# Patient Record
Sex: MALE | Race: WHITE | NOT HISPANIC OR LATINO | Employment: FULL TIME | URBAN - NONMETROPOLITAN AREA
[De-identification: names, ages, dates, MRNs, and addresses within clinical notes are randomized per-mention and may not be internally consistent; named-entity substitution may affect disease eponyms.]

---

## 2024-01-22 ENCOUNTER — TELEMEDICINE (OUTPATIENT)
Dept: PRIMARY CARE | Facility: CLINIC | Age: 49
End: 2024-01-22
Payer: COMMERCIAL

## 2024-01-22 DIAGNOSIS — J01.00 ACUTE NON-RECURRENT MAXILLARY SINUSITIS: Primary | ICD-10-CM

## 2024-01-22 PROCEDURE — 99213 OFFICE O/P EST LOW 20 MIN: CPT | Performed by: FAMILY MEDICINE

## 2024-01-22 RX ORDER — AMOXICILLIN AND CLAVULANATE POTASSIUM 875; 125 MG/1; MG/1
875 TABLET, FILM COATED ORAL 2 TIMES DAILY
Qty: 14 TABLET | Refills: 0 | Status: SHIPPED | OUTPATIENT
Start: 2024-01-22 | End: 2024-01-29

## 2024-01-22 ASSESSMENT — ENCOUNTER SYMPTOMS
RHINORRHEA: 1
CHILLS: 0
FEVER: 0
HEMOPTYSIS: 0
HEARTBURN: 0
COUGH: 1
HEADACHES: 1

## 2024-01-22 NOTE — PROGRESS NOTES
Subjective   Patient ID: Sherman Vargas is a 48 y.o. male who presents for No chief complaint on file..    Cough  This is a new problem. The current episode started 1 to 4 weeks ago. The problem has been waxing and waning. The problem occurs hourly. The cough is Non-productive and productive of brown sputum. Associated symptoms include headaches, nasal congestion and rhinorrhea. Pertinent negatives include no chest pain, chills, ear congestion, ear pain, fever, heartburn or hemoptysis. Nothing aggravates the symptoms.      He presents with 3 weeks of not feeling well.  Has had nasal congestion.  Some cough.  Not sure if he has a fever anymore but did.  There is some congestion in his face.  Feels like there is phlegm in his throat.    Review of Systems   Constitutional:  Negative for chills and fever.   HENT:  Positive for rhinorrhea. Negative for ear pain.    Respiratory:  Positive for cough. Negative for hemoptysis.    Cardiovascular:  Negative for chest pain.   Gastrointestinal:  Negative for heartburn.   Neurological:  Positive for headaches.       Objective   There were no vitals taken for this visit.    Physical Exam  Constitutional:       Appearance: Normal appearance.   Eyes:      Conjunctiva/sclera: Conjunctivae normal.   Pulmonary:      Effort: Pulmonary effort is normal.   Skin:     Coloration: Skin is not jaundiced.   Neurological:      Mental Status: He is alert.   Psychiatric:         Mood and Affect: Mood normal.         Assessment/Plan   Diagnoses and all orders for this visit:  Acute non-recurrent maxillary sinusitis  -     amoxicillin-pot clavulanate (Augmentin) 875-125 mg tablet; Take 1 tablet (875 mg) by mouth 2 times a day for 7 days.  Will treat with the Augmentin for a week.  I told him to take this after breakfast and after dinner.  If he is not improving over the next 2 to 3 days, he will call and we will add a steroid.    This visit was done using the epic software so I could see Sherman and  talk to him.

## 2024-01-24 ENCOUNTER — OFFICE VISIT (OUTPATIENT)
Dept: SLEEP MEDICINE | Facility: CLINIC | Age: 49
End: 2024-01-24
Payer: COMMERCIAL

## 2024-01-24 VITALS
HEART RATE: 75 BPM | OXYGEN SATURATION: 94 % | HEIGHT: 73 IN | BODY MASS INDEX: 28.49 KG/M2 | DIASTOLIC BLOOD PRESSURE: 68 MMHG | WEIGHT: 215 LBS | SYSTOLIC BLOOD PRESSURE: 124 MMHG

## 2024-01-24 DIAGNOSIS — G47.33 OBSTRUCTIVE SLEEP APNEA (ADULT) (PEDIATRIC): Primary | ICD-10-CM

## 2024-01-24 PROCEDURE — 1036F TOBACCO NON-USER: CPT | Performed by: INTERNAL MEDICINE

## 2024-01-24 PROCEDURE — 99213 OFFICE O/P EST LOW 20 MIN: CPT | Performed by: INTERNAL MEDICINE

## 2024-01-24 ASSESSMENT — SLEEP AND FATIGUE QUESTIONNAIRES
HOW LIKELY ARE YOU TO NOD OFF OR FALL ASLEEP WHILE LYING DOWN TO REST IN THE AFTERNOON WHEN CIRCUMSTANCES PERMIT: HIGH CHANCE OF DOZING
HOW LIKELY ARE YOU TO NOD OFF OR FALL ASLEEP WHILE SITTING AND TALKING TO SOMEONE: WOULD NEVER DOZE
HOW LIKELY ARE YOU TO NOD OFF OR FALL ASLEEP IN A CAR, WHILE STOPPED FOR A FEW MINUTES IN TRAFFIC: WOULD NEVER DOZE
HOW LIKELY ARE YOU TO NOD OFF OR FALL ASLEEP WHILE WATCHING TV: MODERATE CHANCE OF DOZING
HOW LIKELY ARE YOU TO NOD OFF OR FALL ASLEEP WHILE SITTING AND READING: HIGH CHANCE OF DOZING
HOW LIKELY ARE YOU TO NOD OFF OR FALL ASLEEP WHEN YOU ARE A PASSENGER IN A CAR FOR AN HOUR WITHOUT A BREAK: MODERATE CHANCE OF DOZING
SITING INACTIVE IN A PUBLIC PLACE LIKE A CLASS ROOM OR A MOVIE THEATER: MODERATE CHANCE OF DOZING
ESS-CHAD TOTAL SCORE: 13
HOW LIKELY ARE YOU TO NOD OFF OR FALL ASLEEP WHILE SITTING QUIETLY AFTER LUNCH WITHOUT ALCOHOL: SLIGHT CHANCE OF DOZING

## 2024-01-24 ASSESSMENT — PAIN SCALES - GENERAL: PAINLEVEL: 0-NO PAIN

## 2024-01-24 NOTE — ASSESSMENT & PLAN NOTE
- Sherman Vargas  has sleep apnea and requires treatment.  - Sherman Vargas demonstrates good compliance and benefit from PAP therapy  - Continue Bilevel  PAP 14/10 cmH20 through [facility]  - Order for renewal of PAP supplies placed  -Ordered a ResMed F30 I fit pack to be supplied through [facility] to try instead of the F30

## 2024-01-24 NOTE — PROGRESS NOTES
"  Subjective   Patient ID: Sherman Vargas is a 48 y.o. male who presents for Sleep Apnea, Pap Adherence Followup, and Needs Pap Supplies/new Pap Machine.  HPI    Prior Sleep History:  Recommended he try a nasal mask due to chronic rhinitis being    Current Sleep History:  Here for yearly adherence of bilevel PAP    A downloaded compliance report was reviewed and was interpreted by myself as follows:  > 4 hour compliance was 100%, with an average use of 6 hours and 25 minutes, with a residual AHI 1.9 on bilevel PAP 14/10 cm H2O.     Sherman Vargas reports good benefit from his device.  Reports that he is sleepy when he is inactive or when he has not gotten enough sleep.  Otherwise he is doing well.  He did not like nasal mask and prefers fullface.  Has a ResMed F30 mask which occasionally will leak tries to manage facial hair to minimize leak  ESS: 13     Review of Systems  Review of systems negative except as per HPI  Objective   /68   Pulse 75   Ht 1.854 m (6' 1\")   Wt 97.5 kg (215 lb)   SpO2 94%   BMI 28.37 kg/m²    Physical Exam  PHYSICAL EXAM: GENERAL: alert pleasant and cooperative no acute distress  PSYCH EXAM: alert,oriented, in NAD with a full range of affect, normal behavior and no psychotic features    No results found for: \"IRON\", \"TIBC\", \"FERRITIN\"     Assessment/Plan   Problem List Items Addressed This Visit             ICD-10-CM    Obstructive sleep apnea (adult) (pediatric) - Primary G47.33     - Sherman Vargas  has sleep apnea and requires treatment.  - Sherman Vargas demonstrates good compliance and benefit from PAP therapy  - Continue Bilevel  PAP 14/10 cmH20 through [facility]  - Order for renewal of PAP supplies placed  -Ordered a ResMed F30 I fit pack to be supplied through [facility] to try instead of the F30         Relevant Orders    Positive Airway Pressure (PAP) Therapy            "

## 2024-08-19 ENCOUNTER — OFFICE VISIT (OUTPATIENT)
Dept: ORTHOPEDIC SURGERY | Facility: HOSPITAL | Age: 49
End: 2024-08-19
Payer: COMMERCIAL

## 2024-08-19 ENCOUNTER — HOSPITAL ENCOUNTER (OUTPATIENT)
Dept: RADIOLOGY | Facility: HOSPITAL | Age: 49
Discharge: HOME | End: 2024-08-19
Payer: COMMERCIAL

## 2024-08-19 ENCOUNTER — OFFICE VISIT (OUTPATIENT)
Dept: PRIMARY CARE | Facility: CLINIC | Age: 49
End: 2024-08-19
Payer: COMMERCIAL

## 2024-08-19 VITALS
BODY MASS INDEX: 29.13 KG/M2 | SYSTOLIC BLOOD PRESSURE: 134 MMHG | DIASTOLIC BLOOD PRESSURE: 86 MMHG | WEIGHT: 220.8 LBS | HEART RATE: 78 BPM

## 2024-08-19 VITALS — HEIGHT: 73 IN | WEIGHT: 220 LBS | BODY MASS INDEX: 29.16 KG/M2

## 2024-08-19 DIAGNOSIS — M77.01 MEDIAL EPICONDYLITIS OF RIGHT ELBOW: Primary | ICD-10-CM

## 2024-08-19 DIAGNOSIS — M25.521 ELBOW PAIN, RIGHT: ICD-10-CM

## 2024-08-19 DIAGNOSIS — M62.838 MUSCLE SPASM: Primary | ICD-10-CM

## 2024-08-19 PROBLEM — J31.0 CHRONIC RHINITIS: Status: ACTIVE | Noted: 2024-08-19

## 2024-08-19 PROBLEM — K21.9 GASTROESOPHAGEAL REFLUX DISEASE: Status: ACTIVE | Noted: 2024-08-19

## 2024-08-19 PROBLEM — E78.00 HYPERCHOLESTEROLEMIA: Status: ACTIVE | Noted: 2024-08-19

## 2024-08-19 PROBLEM — J01.00 ACUTE MAXILLARY SINUSITIS: Status: ACTIVE | Noted: 2024-08-19

## 2024-08-19 PROBLEM — J30.9 ALLERGIC RHINITIS: Status: ACTIVE | Noted: 2024-08-19

## 2024-08-19 PROCEDURE — 1036F TOBACCO NON-USER: CPT | Performed by: FAMILY MEDICINE

## 2024-08-19 PROCEDURE — 99204 OFFICE O/P NEW MOD 45 MIN: CPT | Performed by: FAMILY MEDICINE

## 2024-08-19 PROCEDURE — 3008F BODY MASS INDEX DOCD: CPT | Performed by: FAMILY MEDICINE

## 2024-08-19 PROCEDURE — 73080 X-RAY EXAM OF ELBOW: CPT | Mod: RT

## 2024-08-19 PROCEDURE — 99213 OFFICE O/P EST LOW 20 MIN: CPT | Performed by: FAMILY MEDICINE

## 2024-08-19 PROCEDURE — 73080 X-RAY EXAM OF ELBOW: CPT | Mod: RIGHT SIDE | Performed by: RADIOLOGY

## 2024-08-19 PROCEDURE — 99214 OFFICE O/P EST MOD 30 MIN: CPT | Performed by: FAMILY MEDICINE

## 2024-08-19 PROCEDURE — L3908 WHO COCK-UP NONMOLDE PRE OTS: HCPCS | Performed by: FAMILY MEDICINE

## 2024-08-19 RX ORDER — PREDNISONE 20 MG/1
20 TABLET ORAL DAILY
COMMUNITY
Start: 2024-08-11

## 2024-08-19 RX ORDER — CYCLOBENZAPRINE HCL 10 MG
10 TABLET ORAL
COMMUNITY
Start: 2024-08-11

## 2024-08-19 RX ORDER — MONTELUKAST SODIUM 10 MG/1
10 TABLET ORAL DAILY
COMMUNITY

## 2024-08-19 RX ORDER — OMEPRAZOLE 40 MG/1
40 CAPSULE, DELAYED RELEASE ORAL
COMMUNITY

## 2024-08-19 ASSESSMENT — PATIENT HEALTH QUESTIONNAIRE - PHQ9
2. FEELING DOWN, DEPRESSED OR HOPELESS: NOT AT ALL
1. LITTLE INTEREST OR PLEASURE IN DOING THINGS: NOT AT ALL
SUM OF ALL RESPONSES TO PHQ9 QUESTIONS 1 AND 2: 0

## 2024-08-19 ASSESSMENT — PAIN SCALES - GENERAL
PAINLEVEL: 0-NO PAIN
PAINLEVEL_OUTOF10: 1

## 2024-08-19 ASSESSMENT — PAIN - FUNCTIONAL ASSESSMENT: PAIN_FUNCTIONAL_ASSESSMENT: 0-10

## 2024-08-19 NOTE — PROGRESS NOTES
Subjective   Patient ID: Sherman Vargas is a 49 y.o. male who presents for  follow up   Tendinitus.    HPI   He presents with pain in the right bicep which began last week.  It was so painful that he went over to the urgent care.  They referred him over to orthopedics and then sent him back here.  He would get pain so it is difficult to extend the arm completely.  Pain was on either side of the biceps tendon generally.  He has had some pain in the lateral epicondyle in the past, he states.    Review of Systems    Objective   /86   Pulse 78   Wt 100 kg (220 lb 12.8 oz)   BMI 29.13 kg/m²     Physical Exam  Constitutional:       Appearance: Normal appearance. He is not ill-appearing.   Pulmonary:      Effort: Pulmonary effort is normal.   Musculoskeletal:      Comments: Does not have pain with palpation of the biceps tendon but does have some spasm and tenderness just distal to it and just a little bit more on the proximal side.  Mild tenderness over the lateral epicondyle as well on the right side.   Neurological:      Mental Status: He is alert.         Assessment/Plan   Diagnoses and all orders for this visit:  Muscle spasm  I think this is mostly related to muscle spasm.  I recommend that he massage this area firmly with his thumb 2 or 3 times a day and see if he can release that.  May use heat on this.  Let me know if that is not resolving the symptoms.  He can also keep the appointment with his orthopedist.  Could add laith to the diet as well to help with the muscles.

## 2024-08-19 NOTE — PROGRESS NOTES
** Please excuse any errors in grammar or translation related to this dictation. Voice recognition software was utilized to prepare this document. **    Assessment & Plan:    Dx: Right medial epicondylopathy    Clinical assessment most consistent with right medial epicondylopathy.  Discussed with patient this is related to repetitive wrist flexion causing strain of the common flexor tendon at its origin site.  Discussed treatment options to include counterforce brace, topical or oral NSAID, rehab conditioning program, steroid injection.  Referral to occupational therapy placed.  Recommend he continue use counterforce brace during working hours.  Also recommending use of a wrist brace to provide stability particularly while performing his job as a .  Return precautions given.  Follow-up in clinic for reassessment if condition not improving over the next 5 to 6 weeks or acutely worsens.    Patient was prescribed a neutral wrist brace for medial epicondylopathy.  The patient has weakness, pain, instability and/or deformity of their right wrist which requires stabilization from this orthosis to improve their function.    Verbal instructions for the use, wear schedule, cleaning and application of this item were given.  Patient was instructed that should the brace result in increased pain, decreased sensation, increased swelling, or an overall worsening of their medical condition, to please contact our office immediately. Orthotic management and training was provided for skin care, modifications due to healing tissues, edema changes, interruption in skin integrity, and safety precautions with the orthosis.      Chief complaint:  Right elbow pain    HPI:  49-year-old right dominant male presents to Ortho injury clinic with right elbow pain.  Symptoms has been present for several months however acutely worsened 1 week ago.  For this reason he went to an urgent care and received steroid shot in his buttock and  prescribed a short course of oral steroids to follow.  He reports this resolved his acuity but continues to experience his baseline level of pain.  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  Prior to his urgent care visit he had been using a counterforce brace as well as an elbow sleeve and taking oral NSAIDs.  No trauma to the elbow reported.  Pain is localized to the medial aspect of the forearm.    Patient Active Problem List   Diagnosis    Obstructive sleep apnea (adult) (pediatric)    Acute maxillary sinusitis    Allergic rhinitis    Chronic rhinitis    Gastroesophageal reflux disease    Hypercholesterolemia     History reviewed. No pertinent surgical history.  Current Outpatient Medications on File Prior to Visit   Medication Sig Dispense Refill    cyclobenzaprine (Flexeril) 10 mg tablet Take 1 tablet (10 mg) by mouth.      montelukast (Singulair) 10 mg tablet Take 1 tablet (10 mg) by mouth once daily.      omeprazole (PriLOSEC) 40 mg DR capsule Take 1 capsule (40 mg) by mouth once daily in the morning. Take before meals.      predniSONE (Deltasone) 20 mg tablet Take 1 tablet (20 mg) by mouth once daily.       No current facility-administered medications on file prior to visit.       Exam:  RIGHT Elbow Exam:  No swelling, erythema or ecchymosis present.  Non-TTP over radial head, olecranon, medial epicondyle, lateral epicondyle  Flexion to [150] of 150deg, Extension to [0] of 0deg, Pronate/Supinate [90]/[90] of 90/90deg  5/5 strength elbow flexion and extension, wrist extension and flexion, finger spread  SILT  [Negative] Tinel´s at Cubital tunnel  Increased pain with resisted wrist flexion  [No] pain with resisted wrist extension  [Negative] Modified milking maneuver  [Negative] Hook test    General Exam:  Constitutional - NAD, AAO x 3, conversing appropriately.  HEENT- Normocephalic and atraumatic. EOMI, PERRLA, No scleral icterus. No facial deformities. Hearing grossly  normal.  Lungs - Breathing non-labored with normal rate. No accessory muscle use.  CV - Extremities warm and well-perfused, brisk capillary refill present.   Neuro - CN II-XII grossly intact.    Results:  X-rays of right elbow obtained 8/19/2024 personally interpreted as no acute fracture with normal alignment.  Degenerative changes with joint space narrowing and osteophyte formation are present.  Lab Results   Component Value Date    CREATININE 1.1 06/17/2023    EGFR 84 06/17/2023

## 2024-08-19 NOTE — PATIENT INSTRUCTIONS
Heat this area and massage it firmly.   Let me know if that's not doing it.  May add laith to the diet and any root vegetable or peanuts.

## 2024-08-27 ENCOUNTER — EVALUATION (OUTPATIENT)
Dept: OCCUPATIONAL THERAPY | Facility: CLINIC | Age: 49
End: 2024-08-27
Payer: COMMERCIAL

## 2024-08-27 DIAGNOSIS — M25.521 RIGHT ELBOW PAIN: Primary | ICD-10-CM

## 2024-08-27 DIAGNOSIS — M77.01 MEDIAL EPICONDYLITIS OF RIGHT ELBOW: ICD-10-CM

## 2024-08-27 PROCEDURE — 97165 OT EVAL LOW COMPLEX 30 MIN: CPT | Mod: GO

## 2024-08-27 PROCEDURE — 97140 MANUAL THERAPY 1/> REGIONS: CPT | Mod: GO

## 2024-08-27 NOTE — PROGRESS NOTES
Occupational Therapy  Occupational Therapy Orthopedic Evaluation    Patient Name: Sherman Vargas  MRN: [id number]  Today's Date: 8/27/2024  Time Calculation  Start Time: 1535  Stop Time: 1615  Time Calculation (min): 40 min  OT Evaluation Time Entry  OT Evaluation (Low) Time Entry: 30, OT Therapeutic Procedures Time Entry  Manual Therapy Time Entry: 10,      Insurance:  Visit number: 1   Authorization info: auth required   Insurance Type: [place]     General:  Reason for visit: R elbow and forearm pain   Referred by: Kinza Hopper     Current Problem  Problem List Items Addressed This Visit             ICD-10-CM    Right elbow pain - Primary M25.521     Other Visit Diagnoses         Codes    Medial epicondylitis of right elbow     M77.01            Precautions: none         Medical History Form: Reviewed (scanned into chart)    Subjective:   Chief Complaint: Pain R biceps and proximal radial forearm   Onset: 8/10/2024  DOUG: Overuse Pt was at work using a  with elbow flexed and was unable to straighten elbow and reported swelling in hand the next day       Hand Dominance: Right    Current Condition since injury:   better   Pain improved with steroids     PAIN     Location: R biceps and proximal radial forearm   Description: Tender   3/10  Aggravating Factors: straightening elbow, supination    Relieving Factors: Rest     Relevant Information (PMH & Previous Tests/Imaging): XR showed mild degenerative changes   Previous Interventions/Treatments: Injections , bracing     Prior Level of Function (PLOF)  Exercise/Physical Activity: Moderately active   Work/School: [employer]   Current ADL/IADL Status: Independent      Patients Living Environment: Reviewed and no concern    Primary Language: English    Pt goals for therapy: Learn to properly stretch and avoid further issues     Red Flags: Do you have any of the following? No  Fever/chills, unexplained weight changes, dizziness/fainting, unexplained change in  bowel or bladder functions, unexplained malaise or muscle weakness, night pain/sweats, numbness or tingling    Objective:  ELBOW AROM (Degrees)   R L   Extension -20    Flexion 140    Pronation 90    Supination 80             Hand Strength Measures (lbs)   R L   Dynamometer  90 85   Lateral Pinch     3jaw Pinch          Special Tests  Pain with resisted wrist flexion: Negative   Pain with resisted wrist extension: Negative   Pain with resisted elbow flexion: Positive   Pain with supination: Positive   Tenderness over medial epicondyle: negative     AIN Weakness: negative   PIN Weakness: negative     Physical Observation: Pt unable to fully extend elbow   Edema: none     Sensory: WNL   Numbness/Tingling: none         Outcome Measures:  UEFI: 76/80    EDUCATION: home exercise program, plan of care, activity modifications, pain management, and injury pathology   Access Code: [email or web address]  URL: [email or web address]  Date: 08/27/2024  Prepared by: Jonathan Rincon    Exercises  - Standing Wrist Flexion Stretch  - 2-3 x daily - 7 x weekly - 1 sets - 2-3 reps - 20 hold  - Standing Wrist Extension Stretch  - 2-3 x daily - 7 x weekly - 1 sets - 2-3 reps - 20 hold  - Standing Bicep Stretch at Wall  - 2-3 x daily - 7 x weekly - 1 sets - 2-3 reps - 20 hold    Goals:  1. Pt will achieve 0 degrees of R elbow extension to improve performance with ADL's   2. Pt will have 0/10 R elbow/forearm pain with ADL's and work tasks   3. Pt will have  no difficulty completing usual work and  housework activties   4. 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 Pt would benefit from occupational therapy to address the impairments found & listed previously in the objective section in order to return to safe and pain-free ADLs and prior level of function.       Clinical Presentation: Evolving with changing characteristics  Personal Factors Impacting Care: None    Plan:      Planned Interventions include: therapeutic exercise, therapeutic activity, self-care home management, manual therapy, therapeutic activities, gait training, neuromuscular coordination, vasopneumatic, dry needling, aquatic therapy, electric stimulation, fluidotherapy, ultrasound, kinesiotaping, orthosis fabrication, wound care  Frequency: 1-2 x Week  Duration: 6 Weeks    Ambulatory Screening Summary:      Screening  Frequency  Date Last Completed   Spiritual and Cultural Beliefs   Screening each visit or episode of care 8/19/2024   Falls Risk Screening every ambulatory visit [unfilled]   Pain Screening annually at primary care visit  8/19/2024   Domestic Violence screening annually at primary care visit 8/19/2024   Depression Screening annually in the primary care setting 8/19/2024   Suicide Risk Screening annually in the primary care setting    Nutrition and Food Insecurity   Screening at least annually at primary care visit     Key Learner annually in the primary care setting 8/19/2024   Drug Screen  8/19/2024  1:06 PM   Alcohol Screen  8/19/2024  1:06 PM   Advance Directive  8/19/2024       Jonathan Rincon, OT

## 2024-09-11 ENCOUNTER — TREATMENT (OUTPATIENT)
Dept: OCCUPATIONAL THERAPY | Facility: CLINIC | Age: 49
End: 2024-09-11
Payer: COMMERCIAL

## 2024-09-11 DIAGNOSIS — M25.521 PAIN IN RIGHT ELBOW: ICD-10-CM

## 2024-09-11 DIAGNOSIS — M77.01 MEDIAL EPICONDYLITIS, RIGHT ELBOW: ICD-10-CM

## 2024-09-11 PROCEDURE — 97140 MANUAL THERAPY 1/> REGIONS: CPT | Mod: GO

## 2024-09-11 PROCEDURE — 97016 VASOPNEUMATIC DEVICE THERAPY: CPT | Mod: GO

## 2024-09-11 NOTE — PROGRESS NOTES
"Occupational Therapy    Occupational Therapy Treatment    Name: Sherman Vargas  MRN: [id number]  : 1975  Date: 2024  Time Calculation  Start Time: 1605  Stop Time: 1700  Time Calculation (min): 55 min  OT Therapeutic Procedures Time Entry  Manual Therapy Time Entry: 40, OT Modalities Time Entry  Vasopneumatic Devices Time Entry: 15  Insurance:  Visit number: 2  Authorization info: auth required   Insurance Type: [place]   Current Problem:  Problem List Items Addressed This Visit    None  Visit Diagnoses         Codes    Pain in right elbow     M25.521    Medial epicondylitis, right elbow     M77.01            Assessment:   Pt had 5 degree improvement with R elbow extension. Continues to have pain with extending elbow and resistance during elbow flexion   Plan:   Continue to progress elbow extension and decrease biceps tightness    Subjective \"I can feel it pulling when extend my elbow\"  General:        Pain Assessment:   4/10    Objective    ELBOW AROM (Degrees)    R L   Extension -15     Flexion 140     Pronation 90     Supination 80        Modalities:  Gameready R elbow x 15 min  Communication:     Splinting:     Therapeutic Exercise   Wand assisted elbow extension x 10  Manual Therapy  IASTM to biceps and forearm   Contract relax for elbow extension   Wrist flexor/extensor stretch   Therapy/Activity:   Strength:     Other Activity:     Outcome Measures:      OP EDUCATION:     Goals:  1. Pt will achieve 0 degrees of R elbow extension to improve performance with ADL's   2. Pt will have 0/10 R elbow/forearm pain with ADL's and work tasks   3. Pt will have  no difficulty completing usual work and  housework activties   4. Pt will demonstrate ability to perform HEP independently                  "

## 2024-09-18 ENCOUNTER — TREATMENT (OUTPATIENT)
Dept: OCCUPATIONAL THERAPY | Facility: CLINIC | Age: 49
End: 2024-09-18
Payer: COMMERCIAL

## 2024-09-18 ENCOUNTER — APPOINTMENT (OUTPATIENT)
Dept: ALLERGY | Facility: CLINIC | Age: 49
End: 2024-09-18
Payer: COMMERCIAL

## 2024-09-18 DIAGNOSIS — M25.521 PAIN IN RIGHT ELBOW: ICD-10-CM

## 2024-09-18 DIAGNOSIS — J30.89 ALLERGIC RHINITIS DUE TO OTHER ALLERGIC TRIGGER, UNSPECIFIED SEASONALITY: Primary | ICD-10-CM

## 2024-09-18 DIAGNOSIS — M77.01 MEDIAL EPICONDYLITIS, RIGHT ELBOW: ICD-10-CM

## 2024-09-18 DIAGNOSIS — H10.45 CHRONIC ALLERGIC CONJUNCTIVITIS: ICD-10-CM

## 2024-09-18 PROCEDURE — 97140 MANUAL THERAPY 1/> REGIONS: CPT | Mod: GO

## 2024-09-18 PROCEDURE — 97016 VASOPNEUMATIC DEVICE THERAPY: CPT | Mod: GO

## 2024-09-18 PROCEDURE — 99204 OFFICE O/P NEW MOD 45 MIN: CPT | Performed by: ALLERGY & IMMUNOLOGY

## 2024-09-18 PROCEDURE — 97110 THERAPEUTIC EXERCISES: CPT | Mod: GO

## 2024-09-18 PROCEDURE — 95004 PERQ TESTS W/ALRGNC XTRCS: CPT | Performed by: ALLERGY & IMMUNOLOGY

## 2024-09-18 RX ORDER — LEVOCETIRIZINE DIHYDROCHLORIDE 5 MG/1
5 TABLET, FILM COATED ORAL DAILY
Qty: 90 TABLET | Refills: 3 | Status: SHIPPED | OUTPATIENT
Start: 2024-09-18 | End: 2025-09-18

## 2024-09-18 RX ORDER — MONTELUKAST SODIUM 10 MG/1
10 TABLET ORAL DAILY
Qty: 90 TABLET | Refills: 3 | Status: SHIPPED | OUTPATIENT
Start: 2024-09-18

## 2024-09-18 RX ORDER — AZELASTINE 1 MG/ML
2 SPRAY, METERED NASAL 2 TIMES DAILY
Qty: 90 ML | Refills: 3 | Status: SHIPPED | OUTPATIENT
Start: 2024-09-18 | End: 2025-09-18

## 2024-09-18 NOTE — PROGRESS NOTES
"  Occupational Therapy    Occupational Therapy Treatment    Name: Sherman Vargas  MRN: [id number]  : 1975  Date: 2024  Time Calculation  Start Time: 1605  Stop Time: 1700  Time Calculation (min): 55 min  OT Therapeutic Procedures Time Entry  Manual Therapy Time Entry: 20  Therapeutic Exercise Time Entry: 20, OT Modalities Time Entry  Vasopneumatic Devices Time Entry: 15  Insurance:  Visit number: 3  Authorization info: auth required   Insurance Type: [place]   Current Problem:  Problem List Items Addressed This Visit    None  Visit Diagnoses         Codes    Pain in right elbow     M25.521    Medial epicondylitis, right elbow     M77.01            Assessment:   Pt continues to demonstrate improved elbow extension and reports decreased sharp pains R biceps, just some general soreness.   Plan:   Continue to progress elbow extension and decrease biceps tightness. Add PRE's for forearm and eccentric elbow curls.    Subjective \"I get some soreness in my elbow but it's not as bad as it was \"  General:        Pain Assessment:   3/10    Objective    ELBOW AROM (Degrees)    R L   Extension -10     Flexion 140     Pronation 90     Supination 80        Modalities:  Gameready R elbow x 15 min  Communication:     Splinting:     Therapeutic Exercise  UBE x 6 min    Supine elbow extension x 10x3 with 5 lbs   Wrist flexion and extension x 10x3 with 5 lbs   Eccentric elbow extension x 10x2 with 5 lbs   Manual Therapy  IASTM to biceps and forearm   Contract relax for elbow extension   Wrist flexor/extensor stretch   Therapy/Activity:   Strength:     Other Activity:     Outcome Measures:      OP EDUCATION:     Goals:  1. Pt will achieve 0 degrees of R elbow extension to improve performance with ADL's   2. Pt will have 0/10 R elbow/forearm pain with ADL's and work tasks   3. Pt will have  no difficulty completing usual work and  housework activties   4. Pt will demonstrate ability to perform HEP independently    "

## 2024-09-18 NOTE — PROGRESS NOTES
Subjective   Patient ID:   [id number]   Sherman Vargas is a 49 y.o. male who presents for Allergic Rhinitis.    Chief Complaint   Patient presents with    Allergic Rhinitis          HPI  This patient is here to evaluate for:  Allergic rhinitis and conjunctivitis     Dr. Littlejohn patient  Needs refills of montelukast  Azelastine  Levocetirizine    Pmhx: s/p allergy shots for bees as a child; but details not entirely clear.   Obstructive Sleep Apnea on cpap; unable to breath through the nose so difficult to use cpap.   Dust and pollens - he recalls on skin testing but don't have the test results.    Has been stung as an adult and only a local reaction.    Sh: 2 dogs,  Smith mix - only this dog seems to bother him  Doodle  Cat - itchy eyes if exposed  Dogs come into bedroom but don't sleep in the bed.     Fh:  Sister has allergic rhinitis and conjunctivitis       Review of Systems   All other systems reviewed and are negative.        Objective     There were no vitals taken for this visit.     Physical Exam  Constitutional:       General: He is not in acute distress.     Appearance: Normal appearance. He is not ill-appearing.   HENT:      Head: Normocephalic and atraumatic.      Right Ear: Tympanic membrane, ear canal and external ear normal.      Left Ear: Tympanic membrane, ear canal and external ear normal.      Nose: Nose normal. No congestion or rhinorrhea.      Mouth/Throat:      Mouth: Mucous membranes are moist.      Pharynx: Oropharynx is clear. No oropharyngeal exudate or posterior oropharyngeal erythema.   Eyes:      General:         Right eye: No discharge.         Left eye: No discharge.      Conjunctiva/sclera: Conjunctivae normal.   Cardiovascular:      Rate and Rhythm: Normal rate and regular rhythm.      Heart sounds: Normal heart sounds. No murmur heard.     No friction rub. No gallop.   Pulmonary:      Effort: Pulmonary effort is normal. No respiratory distress.      Breath sounds: Normal breath  sounds. No stridor. No wheezing, rhonchi or rales.   Chest:      Chest wall: No tenderness.   Abdominal:      General: Abdomen is flat.      Palpations: Abdomen is soft.   Musculoskeletal:         General: Normal range of motion.      Cervical back: Normal range of motion and neck supple.   Lymphadenopathy:      Cervical: No cervical adenopathy.   Skin:     General: Skin is warm and dry.      Findings: No erythema, lesion or rash.   Neurological:      General: No focal deficit present.      Mental Status: He is alert. Mental status is at baseline.   Psychiatric:         Mood and Affect: Mood normal.         Behavior: Behavior normal.         Thought Content: Thought content normal.         Judgment: Judgment normal.            Current Outpatient Medications   Medication Sig Dispense Refill    cyclobenzaprine (Flexeril) 10 mg tablet Take 1 tablet (10 mg) by mouth.      montelukast (Singulair) 10 mg tablet Take 1 tablet (10 mg) by mouth once daily.      omeprazole (PriLOSEC) 40 mg DR capsule Take 1 capsule (40 mg) by mouth once daily in the morning. Take before meals.      predniSONE (Deltasone) 20 mg tablet Take 1 tablet (20 mg) by mouth once daily.       No current facility-administered medications for this visit.       Summary of the labs over the past 6 months:    No visits with results within 6 Month(s) from this visit.   Latest known visit with results is:   Legacy Encounter on 06/17/2023   Component Date Value Ref Range Status    Calcium 06/17/2023 9.6  8.5 - 10.4 MG/DL Final    AST 06/17/2023 28  5 - 40 U/L Final    Alkaline Phosphatase 06/17/2023 60  35 - 125 U/L Final    Total Bilirubin 06/17/2023 0.6  0.1 - 1.2 MG/DL Final    Total Protein 06/17/2023 7.2  5.9 - 7.9 G/DL Final    Albumin 06/17/2023 4.2  3.5 - 5.0 GM/DL Final    Globulin, Total 06/17/2023 3.0  1.9 - 3.7 G/DL Final    A/G Ratio 06/17/2023 1.4 (L)  1.5 - 3.0 RATIO Final    Sodium 06/17/2023 138  133 - 145 MMOL/L Final    Potassium 06/17/2023  4.8  3.4 - 5.1 MMOL/L Final    Chloride 06/17/2023 102  97 - 107 MMOL/L Final    Bicarbonate 06/17/2023 26  24 - 31 MMOL/L Final    Anion Gap 06/17/2023 10  0 - 19 MMOL/L Final    Urea Nitrogen 06/17/2023 14  8 - 25 MG/DL Final    Creatinine 06/17/2023 1.1  0.4 - 1.6 MG/DL Final    Urea Nitrogen/Creatinine (g/g) Uri* 06/17/2023 12.7  8 - 21 RATIO Final    Glucose 06/17/2023 95  65 - 99 MG/DL Final    ALT (SGPT) 06/17/2023 34  5 - 40 U/L Final    ESTIMATED GFR 06/17/2023 84  mL/min/1.73 m2 Final    Differential Type 06/17/2023 AUTO DIFF   Final    Immature Granulocyte %, Automated 06/17/2023 0.20  0.0 - 1.0 % Final    Neutrophil 06/17/2023 45.30 (L)  50 - 70 % Final    Lymphocytes % 06/17/2023 43.30 (H)  20 - 40 % Final    Monocytes % 06/17/2023 9.50 (H)  0 - 8 % Final    Eosinophil 06/17/2023 1.40  0 - 3 % Final    Basophils % 06/17/2023 0.30  0 - 1 % Final    Immature Granulocytes Absolute, Au* 06/17/2023 0.01  0.0 - 0.1 K/UL Final    Neutrophils Absolute 06/17/2023 2.61  1.8 - 7.7 K/UL Final    Lymphocytes Absolute 06/17/2023 2.50  1.2 - 3.2 K/UL Final    Monocytes Absolute 06/17/2023 0.55  0 - 0.8 K/UL Final    Eosinophils Absolute 06/17/2023 0.08  0 - 0.45 K/UL Final    Basophils Absolute 06/17/2023 0.02  0.00 - 0.22 K/UL Final    WBC 06/17/2023 5.8  4.5 - 11.0 K/UL Final    RBC 06/17/2023 4.71  4.5 - 5.5 M/UL Final    Hemoglobin 06/17/2023 14.6  13.5 - 16.5 GM/DL Final    Hematocrit 06/17/2023 42.7  41 - 50 % Final    MCV 06/17/2023 90.7  80 - 100 FL Final    MCH 06/17/2023 31.0  26 - 34 PG Final    MCHC 06/17/2023 34.2  31 - 37 % Final    RDW-SD 06/17/2023 40.4  37.0 - 54.0 FL Final    RDW-CV 06/17/2023 12.2  11.7 - 15.0 % Final    Platelets 06/17/2023 279  150 - 450 K/UL Final    MPV 06/17/2023 10.3  7.0 - 12.6 CU Final    nRBC 06/17/2023 0  0 /100 WBC Final    ABSOLUTE NEUTROPHIL CALCULATED 06/17/2023 2.61  K/UL Final    SERUM COLOR 06/17/2023 YELLOW   Final    SERUM CLARITY 06/17/2023 SLIGHTLY LIPEMIC    Final    Fasting status 06/17/2023 FASTING   Final    Cholesterol 06/17/2023 238 (H)  133 - 200 MG/DL Final    Triglycerides 06/17/2023 202 (H)  40 - 150 MG/DL Final    HDL 06/17/2023 47  >40 MG/DL Final    LDL Calculated 06/17/2023 151 (H)  65 - 130 MG/DL Final    Cholesterol/HDL Ratio 06/17/2023 5.1  RATIO Final    PSA 06/17/2023 0.5  0.0 - 4.1 NG/ML Final    Thyroid Stimulating Hormone 06/17/2023 2.34  0.27 - 4.20 MIU/L Final    Microscopic 06/17/2023 AUTOMATIC MICROSCOPIC URINES   Final    WBC, Urine 06/17/2023 NONE SEEN  0 - 3 /HPF Final    RBC, Urine 06/17/2023 NONE SEEN  0 - 3 /HPF Final    Bacteria, Urine 06/17/2023 NEGATIVE   Final    Squamous Epithelial Cells, Urine 06/17/2023 NONE SEEN  /HPF Final    Hyaline Casts, Urine 06/17/2023 NONE SEEN  /LPF Final    Color, Urine 06/17/2023 PALE YELLOW   Final    Appearance, Urine 06/17/2023 CLEAR   Final    Specific Gravity, Urine 06/17/2023 1.008  1.005 - 1.030 Final    pH, Urine 06/17/2023 7.0  4.6 - 8.0 Final    Leukocyte Esterase, Urine 06/17/2023 NEGATIVE  NEG Final    Nitrite, Urine 06/17/2023 NEGATIVE  NEG Final    Protein, Urine 06/17/2023 NEGATIVE  NEG mg/dL Final    Glucose, Urine 06/17/2023 NEGATIVE  NEG mg/dL Final    Ketones, Urine 06/17/2023 NEGATIVE  NEG Final    Urobilinogen, Urine 06/17/2023 NORMAL  0 - 1.0 MG/DL Final    Bilirubin, Urine 06/17/2023 NEGATIVE  NEG Final    Blood, Urine 06/17/2023 NEGATIVE  NEG Final       Scratch skin tests were positive to: grass, ragweed, dust mites       Assessment/Plan   Diagnoses and all orders for this visit:  Allergic rhinitis due to other allergic trigger, unspecified seasonality  -     montelukast (Singulair) 10 mg tablet; Take 1 tablet (10 mg) by mouth once daily.  -     azelastine (Astelin) 137 mcg (0.1 %) nasal spray; Administer 2 sprays into each nostril 2 times a day. Use in each nostril as directed  -     levocetirizine (Xyzal) 5 mg tablet; Take 1 tablet (5 mg) by mouth once daily.  Chronic allergic  conjunctivitis  -     montelukast (Singulair) 10 mg tablet; Take 1 tablet (10 mg) by mouth once daily.  -     azelastine (Astelin) 137 mcg (0.1 %) nasal spray; Administer 2 sprays into each nostril 2 times a day. Use in each nostril as directed  -     levocetirizine (Xyzal) 5 mg tablet; Take 1 tablet (5 mg) by mouth once daily.    We performed allergy testing to help determine the etiology of your symptoms. We discussed the results of the testing. Also, we started to focus on treating your problem and we reviewed the management plan.    Regarding dog allergy, secondary options (besides removal of the dog in the home) include keeping the dog out of the bedroom, and using an allergy air filter in the bedroom.    If symptoms not controlled, add flonase also.  If still problematic, then follow-up with my office.     Follow-up in 1 year so we may re-assess you and develop a more long term plan.        Celestine Loving MD

## 2024-09-25 ENCOUNTER — TREATMENT (OUTPATIENT)
Dept: OCCUPATIONAL THERAPY | Facility: CLINIC | Age: 49
End: 2024-09-25
Payer: COMMERCIAL

## 2024-09-25 DIAGNOSIS — M25.521 PAIN IN RIGHT ELBOW: ICD-10-CM

## 2024-09-25 DIAGNOSIS — M77.01 MEDIAL EPICONDYLITIS, RIGHT ELBOW: ICD-10-CM

## 2024-09-25 PROCEDURE — 97110 THERAPEUTIC EXERCISES: CPT | Mod: GO

## 2024-09-25 PROCEDURE — 97140 MANUAL THERAPY 1/> REGIONS: CPT | Mod: GO

## 2024-09-25 PROCEDURE — 97016 VASOPNEUMATIC DEVICE THERAPY: CPT | Mod: GO

## 2024-09-25 NOTE — PROGRESS NOTES
"  Occupational Therapy    Occupational Therapy Treatment    Name: Sherman Vargas  MRN: [id number]  : 1975  Date: 2024  Time Calculation  Start Time: 0405  Stop Time: 0500  Time Calculation (min): 55 min  OT Therapeutic Procedures Time Entry  Manual Therapy Time Entry: 20  Therapeutic Exercise Time Entry: 20, OT Modalities Time Entry  [place] Time Entry: 15  Insurance:  Visit number: 4  Authorization info: auth required   Insurance Type: [employer]   Current Problem:  Problem List Items Addressed This Visit    None  Visit Diagnoses         Codes    Pain in right elbow     M25.521    Medial epicondylitis, right elbow     M77.01            Assessment:   Pt reports decreased pain and improved elbow extension. Would benefit from further OT to work towards full elbow extension   Plan:   Continue to progress elbow extension and decrease biceps tightness. Continue with  PRE's for forearm and eccentric elbow curls.    Subjective \" I feel like I can relax my arm more and it's not as bent \"  General:        Pain Assessment:   2/10    Objective    ELBOW AROM (Degrees)    R L   Extension -10     Flexion 140     Pronation 90     Supination 80        Modalities:  Gameready R elbow x 15 min  Communication:     Splinting:     Therapeutic Exercise  UBE x 6 min    Supine elbow extension x 10x3 with 5 lbs   Wrist flexion and extension x 10x3 with 5 lbs   Eccentric elbow extension x 10x2 with 5 lbs   Manual Therapy  IASTM to biceps and forearm   Contract relax for elbow extension   Wrist flexor/extensor stretch   Therapy/Activity:   Strength:     Other Activity:     Outcome Measures:      OP EDUCATION:     Goals:  1. Pt will achieve 0 degrees of R elbow extension to improve performance with ADL's   2. Pt will have 0/10 R elbow/forearm pain with ADL's and work tasks   3. Pt will have  no difficulty completing usual work and  housework activties   4. Pt will demonstrate ability to perform HEP independently                      "

## 2025-09-17 ENCOUNTER — APPOINTMENT (OUTPATIENT)
Dept: ALLERGY | Facility: CLINIC | Age: 50
End: 2025-09-17
Payer: COMMERCIAL